# Patient Record
Sex: FEMALE | Race: WHITE | NOT HISPANIC OR LATINO | Employment: FULL TIME | URBAN - METROPOLITAN AREA
[De-identification: names, ages, dates, MRNs, and addresses within clinical notes are randomized per-mention and may not be internally consistent; named-entity substitution may affect disease eponyms.]

---

## 2019-12-16 ENCOUNTER — OFFICE VISIT CONVERTED (OUTPATIENT)
Dept: FAMILY MEDICINE CLINIC | Facility: CLINIC | Age: 39
End: 2019-12-16
Attending: NURSE PRACTITIONER

## 2019-12-16 ENCOUNTER — CONVERSION ENCOUNTER (OUTPATIENT)
Dept: FAMILY MEDICINE CLINIC | Facility: CLINIC | Age: 39
End: 2019-12-16

## 2020-09-30 ENCOUNTER — HOSPITAL ENCOUNTER (OUTPATIENT)
Dept: FAMILY MEDICINE CLINIC | Facility: CLINIC | Age: 40
Discharge: HOME | End: 2020-09-30
Attending: NURSE PRACTITIONER

## 2020-09-30 ENCOUNTER — OFFICE VISIT CONVERTED (OUTPATIENT)
Dept: FAMILY MEDICINE CLINIC | Facility: CLINIC | Age: 40
End: 2020-09-30
Attending: NURSE PRACTITIONER

## 2020-10-02 ENCOUNTER — HOSPITAL ENCOUNTER (OUTPATIENT)
Dept: LAB | Facility: HOSPITAL | Age: 40
Discharge: HOME | End: 2020-10-02
Attending: NURSE PRACTITIONER

## 2020-10-02 LAB
ALBUMIN SERPL-MCNC: 4.1 G/DL (ref 3.5–5)
ALBUMIN/GLOB SERPL: 1.5 {RATIO} (ref 1.4–2.6)
ALP SERPL-CCNC: 57 U/L (ref 42–98)
ALT SERPL-CCNC: 10 U/L (ref 10–40)
ANION GAP SERPL CALC-SCNC: 14 MMOL/L (ref 8–19)
AST SERPL-CCNC: 14 U/L (ref 15–50)
BASOPHILS # BLD AUTO: 0.04 10*3/UL (ref 0–0.2)
BASOPHILS NFR BLD AUTO: 0.6 % (ref 0–3)
BILIRUB SERPL-MCNC: 0.44 MG/DL (ref 0.2–1.3)
BUN SERPL-MCNC: 15 MG/DL (ref 5–25)
BUN/CREAT SERPL: 17 {RATIO} (ref 6–20)
CALCIUM SERPL-MCNC: 9 MG/DL (ref 8.7–10.4)
CHLORIDE SERPL-SCNC: 102 MMOL/L (ref 99–111)
CHOLEST SERPL-MCNC: 153 MG/DL (ref 107–200)
CHOLEST/HDLC SERPL: 3.4 {RATIO} (ref 3–6)
CONV ABS IMM GRAN: 0.04 10*3/UL (ref 0–0.2)
CONV CO2: 24 MMOL/L (ref 22–32)
CONV IMMATURE GRAN: 0.6 % (ref 0–1.8)
CONV TOTAL PROTEIN: 6.8 G/DL (ref 6.3–8.2)
CREAT UR-MCNC: 0.89 MG/DL (ref 0.5–0.9)
DEPRECATED RDW RBC AUTO: 42.3 FL (ref 36.4–46.3)
EOSINOPHIL # BLD AUTO: 0.14 10*3/UL (ref 0–0.7)
EOSINOPHIL # BLD AUTO: 2.1 % (ref 0–7)
ERYTHROCYTE [DISTWIDTH] IN BLOOD BY AUTOMATED COUNT: 12.7 % (ref 11.7–14.4)
GFR SERPLBLD BASED ON 1.73 SQ M-ARVRAT: >60 ML/MIN/{1.73_M2}
GLOBULIN UR ELPH-MCNC: 2.7 G/DL (ref 2–3.5)
GLUCOSE SERPL-MCNC: 87 MG/DL (ref 65–99)
HCT VFR BLD AUTO: 40 % (ref 37–47)
HDLC SERPL-MCNC: 45 MG/DL (ref 40–60)
HGB BLD-MCNC: 13.2 G/DL (ref 12–16)
LDLC SERPL CALC-MCNC: 96 MG/DL (ref 70–100)
LYMPHOCYTES # BLD AUTO: 2.28 10*3/UL (ref 1–5)
LYMPHOCYTES NFR BLD AUTO: 33.6 % (ref 20–45)
MCH RBC QN AUTO: 29.9 PG (ref 27–31)
MCHC RBC AUTO-ENTMCNC: 33 G/DL (ref 33–37)
MCV RBC AUTO: 90.7 FL (ref 81–99)
MONOCYTES # BLD AUTO: 0.55 10*3/UL (ref 0.2–1.2)
MONOCYTES NFR BLD AUTO: 8.1 % (ref 3–10)
NEUTROPHILS # BLD AUTO: 3.74 10*3/UL (ref 2–8)
NEUTROPHILS NFR BLD AUTO: 55 % (ref 30–85)
NRBC CBCN: 0 % (ref 0–0.7)
OSMOLALITY SERPL CALC.SUM OF ELEC: 282 MOSM/KG (ref 273–304)
PLATELET # BLD AUTO: 244 10*3/UL (ref 130–400)
PMV BLD AUTO: 9.8 FL (ref 9.4–12.3)
POTASSIUM SERPL-SCNC: 3.8 MMOL/L (ref 3.5–5.3)
RBC # BLD AUTO: 4.41 10*6/UL (ref 4.2–5.4)
SODIUM SERPL-SCNC: 136 MMOL/L (ref 135–147)
T4 FREE SERPL-MCNC: 1.2 NG/DL (ref 0.9–1.8)
TRIGL SERPL-MCNC: 60 MG/DL (ref 40–150)
TSH SERPL-ACNC: 1.48 M[IU]/L (ref 0.27–4.2)
VLDLC SERPL-MCNC: 12 MG/DL (ref 5–37)
WBC # BLD AUTO: 6.79 10*3/UL (ref 4.8–10.8)

## 2020-10-03 LAB
AMPICILLIN SUSC ISLT: 8
AMPICILLIN+SULBAC SUSC ISLT: 4
BACTERIA UR CULT: ABNORMAL
CEFAZOLIN SUSC ISLT: <=4
CEFEPIME SUSC ISLT: <=0.12
CEFTAZIDIME SUSC ISLT: <=1
CEFTRIAXONE SUSC ISLT: <=0.25
CIPROFLOXACIN SUSC ISLT: >=4
ERTAPENEM SUSC ISLT: <=0.12
GENTAMICIN SUSC ISLT: <=1
LEVOFLOXACIN SUSC ISLT: >=8
NITROFURANTOIN SUSC ISLT: <=16
PIP+TAZO SUSC ISLT: <=4
TMP SMX SUSC ISLT: <=20
TOBRAMYCIN SUSC ISLT: <=1

## 2021-03-24 ENCOUNTER — OFFICE VISIT CONVERTED (OUTPATIENT)
Dept: FAMILY MEDICINE CLINIC | Facility: CLINIC | Age: 41
End: 2021-03-24
Attending: NURSE PRACTITIONER

## 2021-03-24 ENCOUNTER — HOSPITAL ENCOUNTER (OUTPATIENT)
Dept: FAMILY MEDICINE CLINIC | Facility: CLINIC | Age: 41
Discharge: HOME | End: 2021-03-24
Attending: NURSE PRACTITIONER

## 2021-03-24 LAB
BILIRUB UR QL STRIP: NEGATIVE
COLOR UR: YELLOW
CONV CLARITY OF URINE: NORMAL
CONV PROTEIN IN URINE BY AUTOMATED TEST STRIP: NEGATIVE
CONV UROBILINOGEN IN URINE BY AUTOMATED TEST STRIP: NORMAL
GLUCOSE UR QL: NEGATIVE
HGB UR QL STRIP: NORMAL
KETONES UR QL STRIP: NEGATIVE
LEUKOCYTE ESTERASE UR QL STRIP: NEGATIVE
NITRITE UR QL STRIP: POSITIVE
PH UR STRIP.AUTO: 6.5 [PH]
SP GR UR: 1.01

## 2021-03-26 LAB
AMPICILLIN SUSC ISLT: 16
AMPICILLIN+SULBAC SUSC ISLT: 4
BACTERIA UR CULT: ABNORMAL
CEFAZOLIN SUSC ISLT: <=4
CEFTAZIDIME SUSC ISLT: <=1
CEFTRIAXONE SUSC ISLT: <=0.25
CIPROFLOXACIN SUSC ISLT: >=4
ERTAPENEM SUSC ISLT: <=0.12
GENTAMICIN SUSC ISLT: <=1
LEVOFLOXACIN SUSC ISLT: >=8
NITROFURANTOIN SUSC ISLT: <=16
TMP SMX SUSC ISLT: <=20
TOBRAMYCIN SUSC ISLT: <=1

## 2021-03-29 LAB
CONV LAST MENSTURAL PERIOD: NORMAL
SPECIMEN SOURCE: NORMAL
SPECIMEN SOURCE: NORMAL
THIN PREP CVX: NORMAL

## 2021-05-13 NOTE — PROGRESS NOTES
Progress Note      Patient Name: Kathy Spicer   Patient ID: [id number]   Sex: Female   YOB: 1980    Primary Care Provider: Jeny MAZARIEGOS   Referring Provider: Jeny MAZARIEGOS    Visit Date: 2020    Provider: DELILAH Funk   Location: [facility] [place]   Location Address: [address] [place] [address], [address]  [place]  [id number]   Location Phone: [phone]          Chief Complaint  · possible UTI      History Of Present Illness  Kathy Spicer is a 40 year old /White female who presents for evaluation and treatment of:      Pt is here for possible UTI. Pt states s/s started approx. one mo ago. She states she took OTC medications and s/s cleared up. 2 wks ago, pt states s/s returned, pressure, painful urination, malodorous urine. Pt took OTC medications again. Pt wants to make sure nothing is still present. She reports dysuria has improved but has pressure to urinate at times.    Pt quit taking Lexapro after one mo. She states she did not notice a difference.  She is not interested in trying anything else.    Pt is due labs.     Pt gets her PAP[facility] at [facility] in [place].       Past Medical History  Disease Name Date Onset Notes   *No Pertinent Past Medical History --  --          Past Surgical History  Procedure Name Date Notes   Laproscopy  Endometriosis         Allergy List  Allergen Name Date Reaction Notes   amoxicillin --  hives --    PENICILLINS --  --  --        Allergies Reconciled  Family Medical History  Disease Name Relative/Age Notes   Stroke Father/  Grandmother (paternal)/   --    Lymphoma, Malignant Father/   --    Prostate cancer Father/   --    Diabetes Grandmother (maternal)/  Mother/   --          Reproductive History  Menstrual   Age Menarche: 14   Pregnancy Summary   Total Pregnancies: 2 Full Term: 1 Premature: 0   Ab Induced: 0 Ab Spontaneous: 0 Ectopics: 0   Multiples: 0 Livin         Social  "History  Finding Status Start/Stop Quantity Notes   Alcohol Never --/-- --  --     --  --/-- --  --    Minimal Amount of Exercise (Once weekly or less) --  --/-- --  --    No known infection risk --  --/-- --  --    Tobacco Never --/-- --  --          Immunizations  NameDate Admin Mfg Trade Name Lot Number Route Inj VIS Given VIS Publication   [id number]/01/2019 NE Not Entered  NE NE     Comments: Pt reports receiving flu vaccine at [facility] 12/19         Review of Systems  · Constitutional  o Denies  o : fever, fatigue, weight loss, weight gain  · Cardiovascular  o Denies  o : lower extremity edema, claudication, chest pressure, palpitations  · Respiratory  o Denies  o : shortness of breath, wheezing, cough, hemoptysis, dyspnea on exertion  · Gastrointestinal  o Denies  o : nausea, vomiting, diarrhea, constipation, abdominal pain  · Genitourinary  o Admits  o : urgency, dysuria      Vitals  Date Time BP Position Site L\R Cuff Size HR RR TEMP (F) WT  HT  BMI kg/m2 BSA m2 O2 Sat FR L/min FiO2 HC       06/12/2015 10:18 /79 Sitting    74 - R  97.5 149lbs 0oz 5'  5\" 24.79 1.76       12/16/2019 03:23 /83 Sitting    84 - R   162lbs 2oz 5'  5\" 26.98 1.84 99 %      09/30/2020 08:52 /71 Sitting    73 - R   161lbs 2oz 5'  5\" 26.81 1.83 100 %  21%          Physical Examination  · Constitutional  o Appearance  o : well-nourished, well developed, alert, in no acute distress  · Ears, Nose, Mouth and Throat  o Ears  o :   § External Ears  § : appearance within normal limits, no lesions present  § Otoscopic Examination  § : tympanic membrane appearance within normal limits bilaterally without perforations, mobility normal  o Nose  o :   § External Nose  § : normal stucture noted.  § Intranasal Exam  § : no swelling, reddness, turbs normal yariel.  o Oral Cavity  o :   § Oral Mucosa  § : oral mucosa normal without pallor or cyanosis  § Lips  § : lip appearance normal  § Teeth  § : normal dentition for " age  § Gums  § : gums pink, non-swollen, no bleeding present  § Tongue  § : tongue appearance normal  § Palate  § : hard palate normal, soft palate appearance normal  o Throat  o :   § Oropharynx  § : no inflammation or lesions present, tonsils within normal limits  · Respiratory  o Respiratory Effort  o : breathing unlabored  o Auscultation of Lungs  o : normal breath sounds throughout  · Cardiovascular  o Heart  o :   § Auscultation of Heart  § : regular rate and rhythm, no murmurs, gallops or rubs  § Palpation of Heart  § : normal apical impulse, no cardiac thrill present  o Peripheral Vascular System  o :   § Pedal Pulses  § : pulses 2 bilaterally  § Extremities  § : no cyanosis, clubbing or edema; less than 2 second refill noted  · Gastrointestinal  o Abdominal Examination  o : abdomen nontender to palpation, tone normal without rigidity or guarding, no masses present, abdominal contour scaphoid  o Liver and spleen  o : no hepatomegaly present, liver nontender to palpation, spleen not palpable  · Genitourinary  o FEMALE  EXAM:  o :   §  and rectal exam deferred  § : negative CVA tenderness  · Skin and Subcutaneous Tissue  o General Inspection  o : no rashes or lesions present, no areas of discoloration  · Neurologic  o Mental Status Examination  o :   § Orientation  § : grossly oriented to person, place and time  o Cranial Nerves  o : cranial nerves intact and symmetric throughout  · Psychiatric  o Mood and Affect  o : mood normal, affect appropriate, denies any SI/HI          Results  · In-Office Procedures  o Lab procedure  § IOP - Urinalysis without Microscopy (Clinitek) [facility] (50706)   § Color Ur: Lt. Yellow   § Clarity Ur: Clear   § Glucose Ur Ql Strip: Negative   § Bilirub Ur Ql Strip: Negative   § Ketones Ur Ql Strip: Negative   § Sp Gr Ur Qn: 1.025   § Hgb Ur Ql Strip: Trace-Intact   § pH Ur-LsCnc: 6.0   § Prot Ur Ql Strip: Negative   § Urobilinogen Ur Strip-mCnc: 0.2 E.U./dL   § Nitrite Ur Ql Strip:  Negative   § WBC Est Ur Ql Strip: Negative       Assessment  · Fatigue     780.79/R53.83  · Screening for lipid disorders     V77.91/Z13.220  · Malodorous urine     791.9/R82.90  · Painful urination     788.1/R30.9  · Dysuria     788.1/R30.0  · Screening for thyroid disorder     V77.0/Z13.29      Plan  · Orders  o CBC with Auto Diff [facility] (12732) - 780.79/R53.83 - 09/30/2020  o CMP [facility] (37318) - 780.79/R53.83 - 09/30/2020  o Thyroid Profile (96677, 46606, THYII) - 780.79/R53.83 - 09/30/2020  o Lipid Panel [facility] (04921) - V77.91/Z13.220 - 09/30/2020  o Urine culture (11762, 37326) - 791.9/R82.90, 788.1/R30.9, 788.1/R30.0 - 09/30/2020  o ACO-39: Current medications updated and reviewed (, 1159F) - - 09/30/2020  o OB/GYN CONSULTATION (OBGYN) - - 09/30/2020  · Medications  o Medications have been Reconciled  o Transition of Care or Provider Policy  · Instructions  o Patient was educated/instructed on their diagnosis, treatment and medications prior to discharge from the clinic today.  o Patient instructed to seek medical attention urgently for new or worsening symptoms.  o Call the office with any concerns or questions.  · Disposition  o Call or Return if symptoms worsen or persist.            Electronically Signed by: DELILAH Funk -Author on September 30, 2020 09:11:03 AM

## 2021-05-14 VITALS
BODY MASS INDEX: 27.05 KG/M2 | HEIGHT: 65 IN | HEART RATE: 82 BPM | WEIGHT: 162.37 LBS | OXYGEN SATURATION: 100 % | SYSTOLIC BLOOD PRESSURE: 126 MMHG | DIASTOLIC BLOOD PRESSURE: 84 MMHG

## 2021-05-14 VITALS
HEART RATE: 73 BPM | WEIGHT: 161.12 LBS | SYSTOLIC BLOOD PRESSURE: 109 MMHG | OXYGEN SATURATION: 100 % | HEIGHT: 65 IN | BODY MASS INDEX: 26.84 KG/M2 | DIASTOLIC BLOOD PRESSURE: 71 MMHG

## 2021-05-14 NOTE — PROGRESS NOTES
Progress Note      Patient Name: Kathy Spicer   Patient ID: [id number]   Sex: Female   YOB: 1980    Primary Care Provider: Jeny MAZARIEGOS   Referring Provider: Jeny MAZARIEGOS    Visit Date: 2021    Provider: DELILAH Funk   Location: [facility] [place]   Location Address: [address] [place] [address], [address]  [place], [place]  [id number]   Location Phone: [phone]          Chief Complaint  · Annual Exam  · PAP exam  · (Health Maintainence Information Reviewed Under Results)      History Of Present Illness  Kathy Spicer is a 40 year old /White female who presents for evaluation and treatment of:   Last PAP Smear: 2020.   No current complaints.      Pt is here for her pap smear, she has noticed that she has a strong foul odor with her urine, no pain with urination.     LMP: 3 weeks ago  flu: declined  labs:2020       Past Medical History  Disease Name Date Onset Notes   *No Pertinent Past Medical History --  --          Past Surgical History  Procedure Name Date Notes   Laproscopy  Endometriosis         Allergy List  Allergen Name Date Reaction Notes   amoxicillin --  hives --    PENICILLINS --  --  --          Family Medical History  Disease Name Relative/Age Notes   Stroke Father/  Grandmother (paternal)/   --    Lymphoma, Malignant Father/   --    Prostate cancer Father/   --    Diabetes Grandmother (maternal)/  Mother/   --          Reproductive History  Menstrual   Age Menarche: 14   Pregnancy Summary   Total Pregnancies: 2 Full Term: 1 Premature: 0   Ab Induced: 0 Ab Spontaneous: 0 Ectopics: 0   Multiples: 0 Livin         Social History  Finding Status Start/Stop Quantity Notes   Alcohol Never --/-- --  --     --  --/-- --  --    Minimal Amount of Exercise (Once weekly or less) --  --/-- --  --    No known infection risk --  --/-- --  --    Tobacco Never --/-- --  --          Immunizations  NameDate Admin Mfg Trade  "Name Lot Number Route Inj VIS Given VIS Publication   [id number]/01/2019 NE Not Entered  NE NE     Comments: Pt reports receiving flu vaccine at [facility] 12/19         Review of Systems  · Constitutional  o Denies  o : fever, fatigue, weight loss, weight gain  · Cardiovascular  o Denies  o : lower extremity edema, claudication, chest pressure, palpitations  · Respiratory  o Denies  o : shortness of breath, wheezing, cough, hemoptysis, dyspnea on exertion  · Gastrointestinal  o Denies  o : nausea, vomiting, diarrhea, constipation, abdominal pain  · Genitourinary  o Denies  o : urgency, frequency, dysuria, nocturia, hematuria      Vitals  Date Time BP Position Site L\R Cuff Size HR RR TEMP (F) WT  HT  BMI kg/m2 BSA m2 O2 Sat FR L/min FiO2 HC       06/12/2015 10:18 /79 Sitting    74 - R  97.5 149lbs 0oz 5'  5\" 24.79 1.76       12/16/2019 03:23 /83 Sitting    84 - R   162lbs 2oz 5'  5\" 26.98 1.84 99 %      09/30/2020 08:52 /71 Sitting    73 - R   161lbs 2oz 5'  5\" 26.81 1.83 100 %  21%    03/24/2021 01:41 /84 Sitting    82 - R   162lbs 6oz 5'  5\" 27.02 1.84 100 %            Physical Examination  · Constitutional  o Appearance  o : well-nourished, in no acute distress  · Ears, Nose, Mouth and Throat  o Ears  o :   § External Ears  § : appearance within normal limits, no lesions present  § Otoscopic Examination  § : tympanic membrane appearance within normal limits bilaterally without perforations, mobility normal  o Nose  o :   § External Nose  § : normal stucture noted.  § Intranasal Exam  § : no swelling, reddness, turbs normal yariel.  o Oral Cavity  o :   § Oral Mucosa  § : oral mucosa normal without pallor or cyanosis  § Lips  § : lip appearance normal  § Teeth  § : normal dentition for age  § Gums  § : gums pink, non-swollen, no bleeding present  § Tongue  § : tongue appearance normal  § Palate  § : hard palate normal, soft palate appearance normal  o Throat  o :   § Oropharynx  § : no " inflammation or lesions present, tonsils within normal limits  · Respiratory  o Respiratory Effort  o : breathing unlabored  o Inspection of Chest  o : normal appearance  o Auscultation of Lungs  o : normal breath sounds throughout  · Cardiovascular  o Heart  o :   § Auscultation of Heart  § : regular rate and rhythm, no murmurs, gallops or rubs  § Palpation of Heart  § : normal apical impulse, no cardiac thrill present  o Peripheral Vascular System  o :   § Extremities  § : no cyanosis, clubbing or edema; less than 2 second refill noted  · Breasts  o Inspection of Breasts  o : breasts symmetrical, no skin changes, no deformities present, no discharge present  o Palpation of Breasts, Axillae  o : no masses present on palpation, no breast tenderness  · Gastrointestinal  o Abdominal Examination  o : abdomen nontender to palpation, tone normal without rigidity or guarding, no masses present, normal bowel sounds  o Liver and spleen  o : no hepatomegaly present, liver nontender to palpation, spleen not palpable  · Genitourinary  o External Genitalia  o : no inflammation, no lesions present  o Vagina  o : normal vaginal vault, no discharge present, no inflammatory lesions present, no masses present  o Bladder  o : nontender to palpation  o Cervix  o : appearance healthy, no lesions present, nontender to palpation, no discharges, no bleeding present, normal midline position  o Uterus  o : nontender to palpation, no masses present, position midline/midplane  o Adnexa  o : no tenderness or masses present on bimanual examination  o Anus  o : no inflammation or lesions present  o Perineum  o : perineum within normal limits  · Lymphatic  o Neck  o : no lymphadenopathy present  o Axilla  o : no lymphadenopathy present  o Groin  o : no lymphadenopathy present  · Skin and Subcutaneous Tissue  o General Inspection  o : no rashes or lesions present, no areas of discoloration  · Neurologic  o Mental Status Examination  o :    § Orientation  § : grossly oriented to person, place and time  o Cranial Nerves  o : cranial nerves intact and symmetric throughout  o Gait and Station  o : normal gait, able to stand without difficulty  · Psychiatric  o Judgement and Insight  o : judgment and insight intact  o Mood and Affect  o : mood normal, affect appropriate          Results  · In-Office Procedures  o Lab procedure  § IOP - Urinalysis without Microscopy (Clinitek) [facility] (13864)   § Color Ur: Yellow   § Clarity Ur: Cloudy   § Glucose Ur Ql Strip: Negative   § Bilirub Ur Ql Strip: Negative   § Ketones Ur Ql Strip: Negative   § Sp Gr Ur Qn: 1.015   § Hgb Ur Ql Strip: Trace-Intact   § pH Ur-LsCnc: 6.5   § Prot Ur Ql Strip: Negative   § Urobilinogen Ur Strip-mCnc: 0.2 E.U./dL   § Nitrite Ur Ql Strip: Positive   § WBC Est Ur Ql Strip: Negative       Assessment  · Screening for depression     V79.0/Z13.89  · Routine gynecological examination     V72.31/Z01.419  · Pap smear, as part of routine gynecological examination     V76.2/Z01.419  · Screening Mammogram     V76.10/Z12.39  · Abnormal urinalysis     791.9/R82.90  · Urine malodor     791.9/R82.90    Problems Reconciled  Plan  · Orders  o ACO-18: Negative screen for clinical depression using a standardized tool () - V79.0/Z13.89 - 03/24/2021  o Pap smear (60544) - V76.2/Z01.419 - 03/24/2021  o Screening Mammogram 3D Bilateral (08537, 75799, ) - V76.10/Z12.39 - 09/02/2021   THALIA 9/2/21 at 11am  o Urine culture (03891, 87286) - 791.9/R82.90 - 03/24/2021  o ACO-14: Influenza immunization was not administered for reasons documented [facility] () - - 03/24/2021  o ACO-39: Current medications updated and reviewed (, 1159B) - - 03/24/2021  · Medications  o Medications have been Reconciled  o Transition of Care or Provider Policy  · Instructions  o Depression Screen completed and scanned into the EMR under the designated folder within the patient's documents.  o Today's PHQ-9 result is 2  o The  provider screening met the required time of 15 minutes.  o **Pap Test/Liquid Based:   o Thin Prep  o Source:  o Vaginal  o ********  o **Perform Reflex Human Papilloma Virus (HPV) High Risk on this Pap (If atypical squamous cells of the undetermined signifigcance (ASCUS)/Atypical Glandular Cells of undetermined significance (AGCUS): Low Grade Squamous Intraepitheal lesion (LGSIL): **  o Yes, if abnormal  o ********  o Medicare:  o No  o **Is this an annual PAP:  o Yes  o Last Menstrual Period (First Day of): three weeks ago  o Patient was educated/instructed on their diagnosis, treatment and medications prior to discharge from the clinic today.  o Flu vaccine declined.  o Counseled on monthly breast self exams.   o Counseled on diet and exercise.   o Counseled on weight-bearing exercise.  o Used cytobrush to obtain Pap smear specimen. Sent to pathology for testing and review.            Electronically Signed by: DELILAH Funk -Author on March 24, 2021 02:11:51 PM

## 2021-05-15 VITALS
DIASTOLIC BLOOD PRESSURE: 83 MMHG | HEART RATE: 84 BPM | SYSTOLIC BLOOD PRESSURE: 130 MMHG | WEIGHT: 162.12 LBS | HEIGHT: 65 IN | OXYGEN SATURATION: 99 % | BODY MASS INDEX: 27.01 KG/M2

## 2021-05-22 ENCOUNTER — TRANSCRIBE ORDERS (OUTPATIENT)
Dept: ADMINISTRATIVE | Facility: HOSPITAL | Age: 41
End: 2021-05-22

## 2021-05-22 DIAGNOSIS — Z12.31 VISIT FOR SCREENING MAMMOGRAM: Primary | ICD-10-CM

## 2021-09-02 ENCOUNTER — APPOINTMENT (OUTPATIENT)
Dept: MAMMOGRAPHY | Facility: HOSPITAL | Age: 41
End: 2021-09-02

## 2021-09-07 ENCOUNTER — OFFICE VISIT (OUTPATIENT)
Dept: FAMILY MEDICINE CLINIC | Facility: CLINIC | Age: 41
End: 2021-09-07

## 2021-09-07 VITALS
DIASTOLIC BLOOD PRESSURE: 58 MMHG | TEMPERATURE: 97.6 F | WEIGHT: 158 LBS | OXYGEN SATURATION: 99 % | HEART RATE: 66 BPM | HEIGHT: 66 IN | SYSTOLIC BLOOD PRESSURE: 96 MMHG | BODY MASS INDEX: 25.39 KG/M2

## 2021-09-07 DIAGNOSIS — L30.9 DERMATITIS: Primary | ICD-10-CM

## 2021-09-07 DIAGNOSIS — U07.1 COVID-19: ICD-10-CM

## 2021-09-07 PROCEDURE — 99213 OFFICE O/P EST LOW 20 MIN: CPT | Performed by: NURSE PRACTITIONER

## 2021-09-07 RX ORDER — METHYLPREDNISOLONE SODIUM SUCCINATE 40 MG/ML
60 INJECTION, POWDER, LYOPHILIZED, FOR SOLUTION INTRAMUSCULAR; INTRAVENOUS ONCE
Status: DISCONTINUED | OUTPATIENT
Start: 2021-09-07 | End: 2022-05-26

## 2021-09-07 NOTE — PROGRESS NOTES
"Chief Complaint  Rash (all over body started Friday 9/3/2021) and Insomnia (Post-COVID-19)    Subjective          Kathy Mccain presents to [facility]  Pt states that she has had this rash since Friday or Saturday has been really bad with itching all over her body that she used her  Prednisone to help with the itching and has helped. States she has been taking Benadryl q hx.  States she has not ate anything new or been exposed to new products or been outdoors. States she did just get over COVID and that is the only thing new. She was diagnosed on 8/26/21 and the rash started about a week later. Admits the rash is itchy and it has been on her arms, leg, groin, abdomen, souls of feet and hands. Her eyelids were swollen on Sunday she applied cool compresses and it resolved.     Pt states that since having COVID she has been having insomnia.    Rash    Insomnia  Associated symptoms include a rash.       She  has no past medical history on file.     Objective   Vital Signs:   BP 96/58 (BP Location: Right arm, Patient Position: Sitting, Cuff Size: Adult)   Pulse 66   Temp 97.6 °F (36.4 °C)   Ht 167.6 cm (66\")   Wt 71.7 kg (158 lb)   SpO2 99%   BMI 25.50 kg/m²     Physical Exam  Constitutional:       Appearance: Normal appearance.   Cardiovascular:      Rate and Rhythm: Normal rate and regular rhythm.      Pulses: Normal pulses.      Heart sounds: Normal heart sounds.   Pulmonary:      Effort: Pulmonary effort is normal.      Breath sounds: Normal breath sounds.   Neurological:      Mental Status: She is alert.        Result Review :              Current Outpatient Medications:   •  triamcinolone (KENALOG) 0.1 % ointment, Apply  topically to the appropriate area as directed 2 (Two) Times a Day., Disp: 80 g, Rfl: 0    Current Facility-Administered Medications:   •  methylPREDNISolone sodium succinate (SOLU-Medrol) injection 60 mg, 60 mg, Intravenous, Once, Vicky Grover " DELILAH Durham   Assessment and Plan    Diagnoses and all orders for this visit:    1. Dermatitis (Primary)  Comments:  rash that waxes and wanes on extremities and trunk-kenalog 60mg IM administered in the office today. Instructed to do zyrtec am and benadryl q hs and pepcid prn  Orders:  -     triamcinolone (KENALOG) 0.1 % ointment; Apply  topically to the appropriate area as directed 2 (Two) Times a Day.  Dispense: 80 g; Refill: 0  -     methylPREDNISolone sodium succinate (SOLU-Medrol) injection 60 mg    2. COVID-19  -     triamcinolone (KENALOG) 0.1 % ointment; Apply  topically to the appropriate area as directed 2 (Two) Times a Day.  Dispense: 80 g; Refill: 0  -     methylPREDNISolone sodium succinate (SOLU-Medrol) injection 60 mg        Follow Up   No follow-ups on file.  Patient was given instructions and counseling regarding her condition or for health maintenance advice. Please see specific information pulled into the AVS if appropriate.     Kathy Mccain  reports that she has never smoked. She has never used smokeless tobacco..              DELILAH Espinal

## 2021-11-04 ENCOUNTER — OFFICE VISIT (OUTPATIENT)
Dept: FAMILY MEDICINE CLINIC | Facility: CLINIC | Age: 41
End: 2021-11-04

## 2021-11-04 VITALS
WEIGHT: 156 LBS | BODY MASS INDEX: 25.07 KG/M2 | DIASTOLIC BLOOD PRESSURE: 69 MMHG | HEART RATE: 72 BPM | SYSTOLIC BLOOD PRESSURE: 116 MMHG | OXYGEN SATURATION: 99 % | HEIGHT: 66 IN

## 2021-11-04 DIAGNOSIS — N92.6 IRREGULAR MENSTRUATION: Primary | ICD-10-CM

## 2021-11-04 PROCEDURE — 99213 OFFICE O/P EST LOW 20 MIN: CPT | Performed by: NURSE PRACTITIONER

## 2021-11-04 NOTE — PROGRESS NOTES
"Chief Complaint  Menstrual Problem    Subjective            Kathy Mccain presents to [facility]  Pt has c/o menstrual bleeding for 2 wks. Pt states this started on 10/16/21 and didn't quit till 11/2/21. This was like having 2 periods back to back. Nothing abnormal such as clotting or heavy.     Pt states here menstrual cycles are every 4 wks. Described as heavy and clotting like.     Pt used to see EPW for GYN, requests to see Hemalatha Prado.  She is concerned about uterine fibroids.    Had a normal pap this year with our office.        PMH  No past medical history on file.    ALLERGY  Allergies   Allergen Reactions   • Amoxicillin Hives        SURGICALHX  No past surgical history on file.     SOCX  Social History     Tobacco Use   • Smoking status: Never Smoker   • Smokeless tobacco: Never Used   Substance Use Topics   • Alcohol use: Never       FAMHX  No family history on file.     MEDSIGONLY  Current Outpatient Medications on File Prior to Visit   Medication Sig   • [DISCONTINUED] triamcinolone (KENALOG) 0.1 % ointment Apply  topically to the appropriate area as directed 2 (Two) Times a Day.     Current Facility-Administered Medications on File Prior to Visit   Medication   • methylPREDNISolone sodium succinate (SOLU-Medrol) injection 60 mg       Health Maintenance Due   Topic Date Due   • ANNUAL PHYSICAL  Never done   • HEPATITIS C SCREENING  Never done       Objective     /69   Pulse 72   Ht 167.6 cm (66\")   Wt 70.8 kg (156 lb)   SpO2 99%   BMI 25.18 kg/m²       Physical Exam  Constitutional:       General: She is not in acute distress.     Appearance: Normal appearance. She is not ill-appearing.   HENT:      Head: Normocephalic and atraumatic.      Mouth/Throat:      Pharynx: No oropharyngeal exudate or posterior oropharyngeal erythema.   Cardiovascular:      Rate and Rhythm: Normal rate and regular rhythm.      Heart sounds: Normal heart sounds. No murmur " heard.      Pulmonary:      Effort: Pulmonary effort is normal. No respiratory distress.      Breath sounds: Normal breath sounds.   Chest:      Chest wall: No tenderness.   Abdominal:      General: There is no distension.      Palpations: There is no mass.      Tenderness: There is no abdominal tenderness. There is no guarding.   Genitourinary:     Comments: deferred  Musculoskeletal:         General: No swelling or tenderness. Normal range of motion.      Cervical back: Normal range of motion and neck supple.   Skin:     General: Skin is warm and dry.      Findings: No rash.   Neurological:      General: No focal deficit present.      Mental Status: She is alert and oriented to person, place, and time. Mental status is at baseline.      Gait: Gait normal.   Psychiatric:         Mood and Affect: Mood normal.         Behavior: Behavior normal.         Thought Content: Thought content normal.         Judgment: Judgment normal.           Result Review :                           Assessment and Plan        Diagnoses and all orders for this visit:    1. Irregular menstruation (Primary)  -     Ambulatory Referral to Obstetrics / Gynecology              Follow Up     Return if symptoms worsen or fail to improve.    Patient was given instructions and counseling regarding her condition or for health maintenance advice. Please see specific information pulled into the AVS if appropriate.     Kathy Mccain  reports that she has never smoked. She has never used smokeless tobacco..

## 2021-12-14 ENCOUNTER — OFFICE VISIT (OUTPATIENT)
Dept: OBSTETRICS AND GYNECOLOGY | Facility: CLINIC | Age: 41
End: 2021-12-14

## 2021-12-14 VITALS
HEART RATE: 73 BPM | WEIGHT: 156 LBS | HEIGHT: 65 IN | DIASTOLIC BLOOD PRESSURE: 87 MMHG | BODY MASS INDEX: 25.99 KG/M2 | SYSTOLIC BLOOD PRESSURE: 132 MMHG

## 2021-12-14 DIAGNOSIS — N80.9 ENDOMETRIOSIS: ICD-10-CM

## 2021-12-14 DIAGNOSIS — N93.9 ABNORMAL UTERINE BLEEDING (AUB): Primary | ICD-10-CM

## 2021-12-14 DIAGNOSIS — E01.0 THYROMEGALY: ICD-10-CM

## 2021-12-14 LAB
ESTRADIOL SERPL HS-MCNC: 608 PG/ML
FSH SERPL-ACNC: 14.5 MIU/ML
PROLACTIN SERPL-MCNC: 22.1 NG/ML (ref 4.79–23.3)

## 2021-12-14 PROCEDURE — 99204 OFFICE O/P NEW MOD 45 MIN: CPT | Performed by: OBSTETRICS & GYNECOLOGY

## 2021-12-14 PROCEDURE — 82670 ASSAY OF TOTAL ESTRADIOL: CPT | Performed by: OBSTETRICS & GYNECOLOGY

## 2021-12-14 PROCEDURE — 84146 ASSAY OF PROLACTIN: CPT | Performed by: OBSTETRICS & GYNECOLOGY

## 2021-12-14 PROCEDURE — 83001 ASSAY OF GONADOTROPIN (FSH): CPT | Performed by: OBSTETRICS & GYNECOLOGY

## 2021-12-14 NOTE — PROGRESS NOTES
"GYN new patient    CC: aub    Tobacco/Nicotine use:  No    HPI:   41 y.o. Contraception or HRT: None    Has a known h/o endometriosis diagnosed on laparoscopy .  She has been asymptomatic since that time until the last year.  Menses are q28-29 days.  In October she had 2 cycles back to back.  States cycles are very heavy.  Lasting 5-6 days. She will pass large clots and then have gushing for the next 48 hours.  Has to go to restroom frequently when menses are heavy.  Can feel the clots coming and then goes to the restroom.  States menses are not painful.  States they have never been heavy.  Was diagnosed with endometriosis during work up for infertility.  Recently within last 2 months has had some dyspareunia- can be positional.  Ref by her PCP      History: PMHx, Meds, Allergies, PSHx, Social Hx, and POBHx all reviewed and updated.  PCP: does manage PMHx and preventative labs    Review of Systems   Genitourinary: Positive for dyspareunia, menstrual problem and vaginal bleeding.   All other systems reviewed and are negative.       /87   Pulse 73   Ht 165.1 cm (65\")   Wt 70.8 kg (156 lb)   LMP 2021 (Exact Date)   BMI 25.96 kg/m²     Physical Exam  Vitals and nursing note reviewed. Exam conducted with a chaperone present.   Constitutional:       Appearance: Normal appearance.   Neck:      Thyroid: Thyromegaly (right sided thyromegaly) present. No thyroid mass.   Cardiovascular:      Rate and Rhythm: Normal rate and regular rhythm.      Heart sounds: Normal heart sounds.   Pulmonary:      Effort: Pulmonary effort is normal.      Breath sounds: Normal breath sounds.   Abdominal:      General: Abdomen is flat. Bowel sounds are normal.      Palpations: Abdomen is soft.   Genitourinary:     General: Normal vulva.      Exam position: Lithotomy position.      Labia:         Right: No lesion.         Left: No lesion.       Urethra: No prolapse or urethral lesion.      Vagina: Normal.      Cervix: " Normal.      Uterus: Normal. Not fixed and not tender.       Adnexa: Right adnexa normal and left adnexa normal.        Right: No mass or tenderness.          Left: No mass or tenderness.     Musculoskeletal:      Cervical back: Full passive range of motion without pain.   Neurological:      Mental Status: She is alert.         ASSESSMENT AND PLAN:  Problem Visit    Diagnoses and all orders for this visit:    1. Abnormal uterine bleeding (AUB) (Primary)  Overview:  Very heavy for the last year    Orders:  -     US Non-ob Transvaginal; Future  -     Follicle Stimulating Hormone; Future  -     Prolactin; Future  -     Estradiol; Future  -     Follicle Stimulating Hormone  -     Prolactin  -     Estradiol    2. Thyromegaly  Overview:  Right sided  Thyroid labs normal 10/2021  Check thyroid ultrasound    Orders:  -     US Thyroid; Future    3. Endometriosis  Overview:  Never required treatment after diagnosis  Could be contributing to symptoms now                  Follow Up:  Return for post ultrasound.        Hemalatha Prado MD  12/14/2021

## 2022-01-11 ENCOUNTER — HOSPITAL ENCOUNTER (OUTPATIENT)
Dept: ULTRASOUND IMAGING | Facility: HOSPITAL | Age: 42
Discharge: HOME | End: 2022-01-11

## 2022-01-11 DIAGNOSIS — N93.9 ABNORMAL UTERINE BLEEDING (AUB): ICD-10-CM

## 2022-01-11 DIAGNOSIS — E01.0 THYROMEGALY: ICD-10-CM

## 2022-01-11 PROCEDURE — 76830 TRANSVAGINAL US NON-OB: CPT

## 2022-01-11 PROCEDURE — 76536 US EXAM OF HEAD AND NECK: CPT

## 2022-01-18 ENCOUNTER — OFFICE VISIT (OUTPATIENT)
Dept: OBSTETRICS AND GYNECOLOGY | Facility: CLINIC | Age: 42
End: 2022-01-18

## 2022-01-18 VITALS
SYSTOLIC BLOOD PRESSURE: 123 MMHG | WEIGHT: 156 LBS | HEART RATE: 83 BPM | BODY MASS INDEX: 25.96 KG/M2 | DIASTOLIC BLOOD PRESSURE: 85 MMHG

## 2022-01-18 DIAGNOSIS — N80.9 ENDOMETRIOSIS: ICD-10-CM

## 2022-01-18 DIAGNOSIS — N93.9 ABNORMAL UTERINE BLEEDING (AUB): Primary | ICD-10-CM

## 2022-01-18 DIAGNOSIS — E01.0 THYROMEGALY: ICD-10-CM

## 2022-01-18 PROCEDURE — 99214 OFFICE O/P EST MOD 30 MIN: CPT | Performed by: OBSTETRICS & GYNECOLOGY

## 2022-01-18 NOTE — ASSESSMENT & PLAN NOTE
I reviewed transvaginal ultrasound and it is normal  I reviewed the patient's blood work with the patient no concerns today  We discussed expectant management versus medical management in particular with a Mirena IUD versus surgical management  The patient wants to consider the Mirena IUD and she was provided with written information

## 2022-01-18 NOTE — ASSESSMENT & PLAN NOTE
Thyroid ultrasound showed a 5 mm and 3 mm nonsuspicious appearing nodule in the upper lobe of the right side  No further intervention is indicated at this time

## 2022-01-18 NOTE — PROGRESS NOTES
GYN Visit    CC: Abnormal uterine bleeding    HPI:   41 y.o.   Pt has no complaints today.  She states her last cycle was not bad          History: PMHx, Meds, Allergies, PSHx, Social Hx, and POBHx all reviewed and updated.  Physical Exam   PHYSICAL EXAM:  /85   Pulse 83   Wt 70.8 kg (156 lb)   LMP 2021   BMI 25.96 kg/m²   General- NAD, alert and oriented, appropriate  Psych- Normal mood, good memory      ASSESSMENT AND PLAN:  Diagnoses and all orders for this visit:    1. Abnormal uterine bleeding (AUB) (Primary)  Overview:  Very heavy for the last year    Assessment & Plan:  I reviewed transvaginal ultrasound and it is normal  I reviewed the patient's blood work with the patient no concerns today  We discussed expectant management versus medical management in particular with a Mirena IUD versus surgical management  The patient wants to consider the Mirena IUD and she was provided with written information      2. Endometriosis  Overview:  Never required treatment after diagnosis  Could be contributing to symptoms now    Assessment & Plan:  Patient states she is experiencing somewhat increased dysmenorrhea than the last 10 years  She is not interested in hysterectomy at this time  Explained that given her history of endometriosis would lean more towards hysterectomy than endometrial ablation should we progress to surgical management      3. Thyromegaly  Overview:  Right sided  Thyroid labs normal 10/2021  Check thyroid ultrasound    Assessment & Plan:  Thyroid ultrasound showed a 5 mm and 3 mm nonsuspicious appearing nodule in the upper lobe of the right side  No further intervention is indicated at this time        Counseling: TRACK MENSES, RTO if <q21d, >7d long, heavy or painful.    ENDOMETRIOSIS- Dx, incidence, familial tendency, recurrence, periods/pain/dyspareunia, pregnancy, risk of other pain syndromes. Tx options wrt pt hx NLT expectant, hormonal (BC, IUD, Lupron, Orilissa, others),  outpt dx L/S, hyst +/- BSO.        [facility]:  [facility]          Hemalatha Prado MD  01/18/2022

## 2022-01-18 NOTE — ASSESSMENT & PLAN NOTE
Patient states she is experiencing somewhat increased dysmenorrhea than the last 10 years  She is not interested in hysterectomy at this time  Explained that given her history of endometriosis would lean more towards hysterectomy than endometrial ablation should we progress to surgical management

## 2022-05-09 ENCOUNTER — OFFICE VISIT (OUTPATIENT)
Dept: FAMILY MEDICINE CLINIC | Facility: CLINIC | Age: 42
End: 2022-05-09

## 2022-05-09 VITALS
WEIGHT: 157 LBS | HEIGHT: 65 IN | OXYGEN SATURATION: 99 % | DIASTOLIC BLOOD PRESSURE: 83 MMHG | BODY MASS INDEX: 26.16 KG/M2 | SYSTOLIC BLOOD PRESSURE: 126 MMHG | HEART RATE: 70 BPM

## 2022-05-09 DIAGNOSIS — R22.31 MASS OF RIGHT UPPER EXTREMITY: Primary | ICD-10-CM

## 2022-05-09 PROCEDURE — 99214 OFFICE O/P EST MOD 30 MIN: CPT | Performed by: NURSE PRACTITIONER

## 2022-05-09 NOTE — PROGRESS NOTES
"Chief Complaint  Mass right upper arm    Subjective            Kathy Mccain presents to [facility]  Pt is here due to a lump on right upper found by dermatology. Pt states there is no pain unless pressed on. Pt states it is soft to touch. Pt unsure how long it has been there.        Past Medical History:   Diagnosis Date   • Abnormal Pap smear of cervix    • Endometriosis    • Herpes    • Ovarian cyst        Allergies   Allergen Reactions   • Amoxicillin Hives        Past Surgical History:   Procedure Laterality Date   • BARTHOLIN GLAND CYST EXCISION     • CERCLAGE CERVIX     • CERVICAL CONE BIOPSY     • WISDOM TOOTH EXTRACTION          Social History     Tobacco Use   • Smoking status: Never Smoker   • Smokeless tobacco: Never Used   Substance Use Topics   • Alcohol use: Never       Family History   Problem Relation Age of Onset   • Thyroid cancer Father         No current outpatient medications on file prior to visit.     Current Facility-Administered Medications on File Prior to Visit   Medication   • methylPREDNISolone sodium succinate (SOLU-Medrol) injection 60 mg       Health Maintenance Due   Topic Date Due   • ANNUAL PHYSICAL  Never done   • COVID-19 Vaccine (1) Never done   • HEPATITIS C SCREENING  Never done       Objective     /83   Pulse 70   Ht 165.1 cm (65\")   Wt 71.2 kg (157 lb)   SpO2 99%   BMI 26.13 kg/m²       Physical Exam  Constitutional:       General: She is not in acute distress.     Appearance: Normal appearance. She is not ill-appearing.   HENT:      Head: Normocephalic and atraumatic.   Cardiovascular:      Rate and Rhythm: Normal rate and regular rhythm.      Heart sounds: Normal heart sounds. No murmur heard.  Pulmonary:      Effort: Pulmonary effort is normal. No respiratory distress.      Breath sounds: Normal breath sounds.   Chest:      Chest wall: No tenderness.   Abdominal:      General: There is no distension.      Palpations: There " is no mass.      Tenderness: There is no abdominal tenderness. There is no guarding.   Musculoskeletal:         General: No swelling or tenderness. Normal range of motion.        Arms:       Cervical back: Normal range of motion and neck supple.      Comments: Grape size palpable, movable, no tender mass to right upper arm   Skin:     General: Skin is warm and dry.      Findings: No rash.   Neurological:      General: No focal deficit present.      Mental Status: She is alert and oriented to person, place, and time. Mental status is at baseline.      Gait: Gait normal.   Psychiatric:         Mood and Affect: Mood normal.         Behavior: Behavior normal.         Thought Content: Thought content normal.         Judgment: Judgment normal.           Result Review :                           Assessment and Plan        Diagnoses and all orders for this visit:    1. Mass of right upper extremity (Primary)  -     US Nonvascular Extremity Limited; Future          I spent 30 minutes caring for Kathy on this date of service. This time includes time spent by me in the following activities:preparing for the visit, obtaining and/or reviewing a separately obtained history, performing a medically appropriate examination and/or evaluation , ordering medications, tests, or procedures, referring and communicating with other health care professionals  and documenting information in the medical record    Follow Up     Return if symptoms worsen or fail to improve.    Patient was given instructions and counseling regarding her condition or for health maintenance advice. Please see specific information pulled into the AVS if appropriate.     Kathy Mccain  reports that she has never smoked. She has never used smokeless tobacco..

## 2022-05-26 ENCOUNTER — TELEPHONE (OUTPATIENT)
Dept: FAMILY MEDICINE CLINIC | Facility: CLINIC | Age: 42
End: 2022-05-26

## 2022-05-26 NOTE — TELEPHONE ENCOUNTER
Caller: Kathy Mccain    Relationship to patient: Self    Best call back number:[phone]    Patient is needing: PATIENT NEEDS TO BE SEEN TODAY HAVING SHARP PAIN IN LEFT SIDE

## 2022-06-08 ENCOUNTER — TELEPHONE (OUTPATIENT)
Dept: FAMILY MEDICINE CLINIC | Facility: CLINIC | Age: 42
End: 2022-06-08

## 2022-06-08 ENCOUNTER — HOSPITAL ENCOUNTER (OUTPATIENT)
Dept: ULTRASOUND IMAGING | Facility: HOSPITAL | Age: 42
Discharge: HOME | End: 2022-06-08
Admitting: NURSE PRACTITIONER

## 2022-06-08 DIAGNOSIS — R22.31 MASS OF RIGHT UPPER EXTREMITY: ICD-10-CM

## 2022-06-08 PROCEDURE — 76882 US LMTD JT/FCL EVL NVASC XTR: CPT

## 2022-06-08 NOTE — TELEPHONE ENCOUNTER
Caller: Kathy Mccain    Relationship: Self    Best call back number: [phone]    What orders are you requesting (i.e. lab or imaging): PELVIC ULTRASOUND    In what timeframe would the patient need to come in: ASAP    Where will you receive your lab/imaging services: [facility]

## 2022-06-08 NOTE — TELEPHONE ENCOUNTER
HUB RELAYED MESSAGE.     PATIENT STATED SHE IS NEEDING ANOTHER ULTRA SOUND    DOCTOR AT URGENT CARE STATED SHE IS NEEDING ANOTHER ULTRA SOUND    PATIENT COMPLETED ULTRA SOUND TODAY 6-8-22    PATIENT IS HAVING PAIN ON SIDE AND WOULD TO CHECK THAT CHECK OUT.    PLEASE ADVISE     CALL BACK [phone]

## 2022-06-08 NOTE — TELEPHONE ENCOUNTER
Attempted to call @ [phone] Mailbox full. Unsure of what pt is needing.     Pt is scheduled today for an US. If pt is asking for the results they are not resulted yet by the radiologist.       OK for HUB if calls back.

## 2022-06-09 ENCOUNTER — TELEMEDICINE (OUTPATIENT)
Dept: FAMILY MEDICINE CLINIC | Facility: CLINIC | Age: 42
End: 2022-06-09

## 2022-06-09 ENCOUNTER — TELEPHONE (OUTPATIENT)
Dept: FAMILY MEDICINE CLINIC | Facility: CLINIC | Age: 42
End: 2022-06-09

## 2022-06-09 VITALS — WEIGHT: 158 LBS | BODY MASS INDEX: 26.33 KG/M2 | HEIGHT: 65 IN

## 2022-06-09 DIAGNOSIS — R10.32 ABDOMINAL PAIN, LLQ: Primary | ICD-10-CM

## 2022-06-09 DIAGNOSIS — D17.21 LIPOMA OF RIGHT UPPER EXTREMITY: ICD-10-CM

## 2022-06-09 PROCEDURE — 99213 OFFICE O/P EST LOW 20 MIN: CPT | Performed by: NURSE PRACTITIONER

## 2022-06-09 NOTE — TELEPHONE ENCOUNTER
----- Message from DELILAH Funk sent at 6/8/2022  3:14 PM EDT -----  Appears to be a lipoma consult general surgery for evaluation

## 2022-06-09 NOTE — PROGRESS NOTES
"You have chosen to receive care through a telehealth visit.  Do you consent to use a video/audio connection for your medical care today? Yes.      Chief Complaint  LLQ pelvic pain    Subjective            Kathy Mccain presents to [facility]  Pt doing Tele Health today to follow up from an  visit on 05/26/22.    Pt was seen for LLQ pelvic pain. Pt was recommended at  to have a pelvic ultrasound done. Pt reports no new symptoms but pain is still present.     Pt had an ultrasound of the right arm yesterday for mass. Results showed possible lipoma. Recommended referral to General Surgery for eval.        Past Medical History:   Diagnosis Date   • Abnormal Pap smear of cervix    • Endometriosis    • Herpes    • Ovarian cyst        Allergies   Allergen Reactions   • Amoxicillin Hives        Past Surgical History:   Procedure Laterality Date   • BARTHOLIN GLAND CYST EXCISION     • CERCLAGE CERVIX     • CERVICAL CONE BIOPSY     • WISDOM TOOTH EXTRACTION          Social History     Tobacco Use   • Smoking status: Never Smoker   • Smokeless tobacco: Never Used   Substance Use Topics   • Alcohol use: Never       Family History   Problem Relation Age of Onset   • Thyroid cancer Father         No current outpatient medications on file prior to visit.     No current facility-administered medications on file prior to visit.       Health Maintenance Due   Topic Date Due   • ANNUAL PHYSICAL  Never done   • COVID-19 Vaccine (1) Never done   • HEPATITIS C SCREENING  Never done       Objective     Ht 165.1 cm (65\")   Wt 71.7 kg (158 lb)   BMI 26.29 kg/m²       Physical Exam  Psychiatric:         Attention and Perception: Attention normal.         Mood and Affect: Mood normal.         Speech: Speech normal.         Behavior: Behavior normal. Behavior is cooperative.         Thought Content: Thought content normal.         Cognition and Memory: Cognition normal.         Judgment: Judgment " normal.           Result Review :                           Assessment and Plan        Diagnoses and all orders for this visit:    1. Abdominal pain, LLQ (Primary)  -     US Pelvis Transvaginal Non OB; Future    2. Lipoma of right upper extremity  -     Ambulatory Referral to General Surgery              Follow Up     Return if symptoms worsen or fail to improve.    Patient was given instructions and counseling regarding her condition or for health maintenance advice. Please see specific information pulled into the AVS if appropriate.     Kathy Mccain  reports that she has never smoked. She has never used smokeless tobacco..

## 2022-07-07 ENCOUNTER — HOSPITAL ENCOUNTER (OUTPATIENT)
Dept: ULTRASOUND IMAGING | Facility: HOSPITAL | Age: 42
Discharge: HOME | End: 2022-07-07
Admitting: NURSE PRACTITIONER

## 2022-07-07 DIAGNOSIS — R10.32 ABDOMINAL PAIN, LLQ: ICD-10-CM

## 2022-07-07 PROCEDURE — 76830 TRANSVAGINAL US NON-OB: CPT

## 2022-07-11 ENCOUNTER — TELEPHONE (OUTPATIENT)
Dept: FAMILY MEDICINE CLINIC | Facility: CLINIC | Age: 42
End: 2022-07-11

## 2022-07-11 DIAGNOSIS — N83.202 BILATERAL OVARIAN CYSTS: Primary | ICD-10-CM

## 2022-07-11 DIAGNOSIS — N83.201 BILATERAL OVARIAN CYSTS: Primary | ICD-10-CM

## 2022-07-11 NOTE — TELEPHONE ENCOUNTER
----- Message from DELILAH Funk sent at 7/8/2022  8:48 AM EDT -----  Cysts possibly noted consult OBGYN to further evaluate

## 2022-07-18 NOTE — PROGRESS NOTES
GYN Visit    CC: Endometriosis    HPI:   41 y.o. Contraception or HRT: Contraception:  None  Menses:   q 28-29 days, lasts 5-6 days, changes products q 2 hrs on heaviest days.   Pain:  Mild, OTC meds control discomfort    Pt has concerns she would like to discuss.          History: PMHx, Meds, Allergies, PSHx, Social Hx, and POBHx all reviewed and updated.  Physical Exam   PHYSICAL EXAM:  /77   Pulse 72   Wt 71.7 kg (158 lb)   LMP 2022 (Approximate)   BMI 26.29 kg/m²   General- NAD, alert and oriented, appropriate  Psych- Normal mood, good memory    PROCEDURE:  US NON-OB TRANSVAGINAL     COMPARISON: [facility], US, US NON-OB TRANSVAGINAL, 2022, 10:56.     INDICATIONS:  Flank pain     TECHNIQUE:    Ultrasound examination of the pelvis was performed, using endovaginal technique.       FINDINGS:          The uterus measures 9.3 cm in length.  The uterus measures 5.6 cm x 5.1 cm in transverse and AP   dimension.  Solitary hypoechoic lesion is seen in the posterior uterine wall on the left measuring   1.5 cm in greatest dimension consistent with a subserosal leiomyoma.  The endometrium is uniformly   echoic.  The endometrial bi layer measures 10 mm.  No cystic intrauterine contents are seen.     The right ovary measures 3.9 cm in greatest dimension, with a volume of 15.7 cc.  Centrally cystic   lesion in the right ovary with relatively thick walls measures 1.9 cm in greatest dimension, and   may represent a resolving cyst.     The left ovary measures 3.3 cm in greatest dimension, with a volume of 9.1 cc.  1.4 cm left ovarian   cyst is probably functional.       IMPRESSION:               Transvaginal pelvic ultrasound, as above.            JABEIR DUBOSE MD         Electronically Signed and Approved By: JABIER DUBOSE MD on 2022 at 16:56       ASSESSMENT AND PLAN:  Diagnoses and all orders for this visit:    1. Abnormal uterine bleeding (AUB)  (Primary)  Overview:  Very heavy for the last year    Assessment & Plan:  At this time patient has no complaints regarding her menstrual cycles        2. Endometriosis  Overview:  Never required treatment after diagnosis  Could be contributing to symptoms now    Assessment & Plan:  Patient has a known history of endometriosis  She had an episode of pelvic pain several weeks ago and had a repeat transvaginal ultrasound  Ultrasound was normal  No further intervention at this time        Counseling: ENDOMETRIOSIS- Dx, incidence, familial tendency, recurrence, periods/pain/dyspareunia, pregnancy, risk of other pain syndromes. Tx options wrt pt hx NLT expectant, hormonal (BC, IUD, Lupron, Orilissa, others), outpt dx L/S, hyst +/- BSO.        Follow Up:  Return for Annual physical.          Hemalatha Prado MD  07/20/2022

## 2022-07-20 ENCOUNTER — OFFICE VISIT (OUTPATIENT)
Dept: OBSTETRICS AND GYNECOLOGY | Facility: CLINIC | Age: 42
End: 2022-07-20

## 2022-07-20 VITALS
HEART RATE: 72 BPM | DIASTOLIC BLOOD PRESSURE: 77 MMHG | SYSTOLIC BLOOD PRESSURE: 117 MMHG | BODY MASS INDEX: 26.29 KG/M2 | WEIGHT: 158 LBS

## 2022-07-20 DIAGNOSIS — N80.9 ENDOMETRIOSIS: ICD-10-CM

## 2022-07-20 DIAGNOSIS — N93.9 ABNORMAL UTERINE BLEEDING (AUB): Primary | ICD-10-CM

## 2022-07-20 PROCEDURE — 99212 OFFICE O/P EST SF 10 MIN: CPT | Performed by: OBSTETRICS & GYNECOLOGY

## 2022-07-20 NOTE — ASSESSMENT & PLAN NOTE
Patient has a known history of endometriosis  She had an episode of pelvic pain several weeks ago and had a repeat transvaginal ultrasound  Ultrasound was normal  No further intervention at this time

## 2022-12-20 ENCOUNTER — OFFICE VISIT (OUTPATIENT)
Dept: FAMILY MEDICINE CLINIC | Facility: CLINIC | Age: 42
End: 2022-12-20

## 2022-12-20 VITALS
DIASTOLIC BLOOD PRESSURE: 68 MMHG | WEIGHT: 167 LBS | OXYGEN SATURATION: 100 % | BODY MASS INDEX: 27.82 KG/M2 | HEIGHT: 65 IN | SYSTOLIC BLOOD PRESSURE: 121 MMHG | HEART RATE: 72 BPM

## 2022-12-20 DIAGNOSIS — H66.90 ACUTE OTITIS MEDIA, UNSPECIFIED OTITIS MEDIA TYPE: Primary | ICD-10-CM

## 2022-12-20 PROCEDURE — 99213 OFFICE O/P EST LOW 20 MIN: CPT | Performed by: NURSE PRACTITIONER

## 2022-12-20 RX ORDER — CEFDINIR 300 MG/1
300 CAPSULE ORAL 2 TIMES DAILY
Qty: 20 CAPSULE | Refills: 0 | Status: SHIPPED | OUTPATIENT
Start: 2022-12-20 | End: 2023-03-28

## 2022-12-20 RX ORDER — METHYLPREDNISOLONE 4 MG/1
TABLET ORAL
Qty: 1 EACH | Refills: 0 | Status: SHIPPED | OUTPATIENT
Start: 2022-12-20 | End: 2023-03-28

## 2022-12-20 NOTE — PROGRESS NOTES
"Chief Complaint  Ear Fullness and sinus pressure    SUBJECTIVE  Kathy Mccain presents to [facility]     Pt here today for sinus and pressure.  States has been having some left ear fullness and off-and-on pain for couple of weeks.  No fever      History of Present Illness  Past Medical History:   Diagnosis Date   • Abnormal Pap smear of cervix    • Endometriosis    • Herpes    • Ovarian cyst       Family History   Problem Relation Age of Onset   • Thyroid cancer Father       Past Surgical History:   Procedure Laterality Date   • BARTHOLIN GLAND CYST EXCISION     • CERCLAGE CERVIX     • CERVICAL CONE BIOPSY     • WISDOM TOOTH EXTRACTION          Current Outpatient Medications:   •  cefdinir (OMNICEF) 300 MG capsule, Take 1 capsule by mouth 2 (Two) Times a Day., Disp: 20 capsule, Rfl: 0  •  methylPREDNISolone (MEDROL) 4 MG dose pack, Take as directed on package instructions., Disp: 1 each, Rfl: 0    OBJECTIVE  Vital Signs:   /68   Pulse 72   Ht 165.1 cm (65\")   Wt 75.8 kg (167 lb)   SpO2 100%   BMI 27.79 kg/m²    Estimated body mass index is 27.79 kg/m² as calculated from the following:    Height as of this encounter: 165.1 cm (65\").    Weight as of this encounter: 75.8 kg (167 lb).     Wt Readings from Last 3 Encounters:   12/20/22 75.8 kg (167 lb)   07/20/22 71.7 kg (158 lb)   06/09/22 71.7 kg (158 lb)     BP Readings from Last 3 Encounters:   12/20/22 121/68   07/20/22 117/77   05/26/22 121/69       Physical Exam  Vitals reviewed.   Constitutional:       Appearance: Normal appearance. She is well-developed.   HENT:      Head: Normocephalic and atraumatic.      Right Ear: Tympanic membrane, ear canal and external ear normal.      Left Ear: Ear canal and external ear normal.      Ears:      Comments: Left TM erythematous, fluid noted      Nose:      Comments: Sinus mildly tender to palpation  Eyes:      Conjunctiva/sclera: Conjunctivae normal.      Pupils: Pupils are " equal, round, and reactive to light.   Cardiovascular:      Rate and Rhythm: Normal rate and regular rhythm.      Heart sounds: No murmur heard.    No friction rub. No gallop.   Pulmonary:      Effort: Pulmonary effort is normal.      Breath sounds: Normal breath sounds. No wheezing or rhonchi.   Skin:     General: Skin is warm and dry.   Neurological:      Mental Status: She is alert and oriented to person, place, and time.      Cranial Nerves: No cranial nerve deficit.   Psychiatric:         Mood and Affect: Mood and affect normal.         Behavior: Behavior normal.         Thought Content: Thought content normal.         Judgment: Judgment normal.          Result Review        No Images in the past 120 days found..     The above data has been reviewed by DELILAH Godinez 12/20/2022 13:58 EST.          Patient Care Team:  Jeny Littlejohn APRN as PCP - General (Nurse Practitioner)        ASSESSMENT & PLAN    Diagnoses and all orders for this visit:    1. Acute otitis media, unspecified otitis media type (Primary)  -     methylPREDNISolone (MEDROL) 4 MG dose pack; Take as directed on package instructions.  Dispense: 1 each; Refill: 0  -     cefdinir (OMNICEF) 300 MG capsule; Take 1 capsule by mouth 2 (Two) Times a Day.  Dispense: 20 capsule; Refill: 0    Discussed amoxicillin allergy with patient, she reports that she had a rash, discussed possible cephalosporin cross sensitivity, states that she believes that she has taken Omnicef in the past and tolerated without issue.     Tobacco Use: Low Risk    • Smoking Tobacco Use: Never   • Smokeless Tobacco Use: Never   • Passive Exposure: Not on file       Follow Up     Return if symptoms worsen or fail to improve.        Patient was given instructions and counseling regarding her condition or for health maintenance advice. Please see specific information pulled into the AVS if appropriate.   I have reviewed information obtained and documented by others and I have  confirmed the accuracy of this documented note.    Raiza Wilson, APRN

## 2023-03-24 ENCOUNTER — TELEPHONE (OUTPATIENT)
Dept: FAMILY MEDICINE CLINIC | Facility: CLINIC | Age: 43
End: 2023-03-24

## 2023-03-24 NOTE — TELEPHONE ENCOUNTER
Caller: Kathy Mccain    Relationship to patient: Self    Best call back number: [phone]    Chief complaint: URINARY PAIN AND STRONG ODOR    Requested date: ASAP    If rescheduling, when is the original appointment: 3/30/23

## 2023-03-28 ENCOUNTER — OFFICE VISIT (OUTPATIENT)
Dept: FAMILY MEDICINE CLINIC | Facility: CLINIC | Age: 43
End: 2023-03-28
Payer: COMMERCIAL

## 2023-03-28 VITALS
WEIGHT: 166 LBS | BODY MASS INDEX: 27.66 KG/M2 | DIASTOLIC BLOOD PRESSURE: 75 MMHG | SYSTOLIC BLOOD PRESSURE: 113 MMHG | HEART RATE: 72 BPM | OXYGEN SATURATION: 99 % | HEIGHT: 65 IN

## 2023-03-28 DIAGNOSIS — R10.2 PELVIC PAIN: ICD-10-CM

## 2023-03-28 DIAGNOSIS — R63.1 POLYDIPSIA: ICD-10-CM

## 2023-03-28 DIAGNOSIS — Z11.59 NEED FOR HEPATITIS C SCREENING TEST: ICD-10-CM

## 2023-03-28 DIAGNOSIS — Z13.29 SCREENING FOR THYROID DISORDER: ICD-10-CM

## 2023-03-28 DIAGNOSIS — Z13.220 SCREENING FOR CHOLESTEROL LEVEL: ICD-10-CM

## 2023-03-28 DIAGNOSIS — R82.90 URINE MALODOR: ICD-10-CM

## 2023-03-28 DIAGNOSIS — Z00.00 ANNUAL PHYSICAL EXAM: Primary | ICD-10-CM

## 2023-03-28 DIAGNOSIS — Z12.31 ENCOUNTER FOR SCREENING MAMMOGRAM FOR MALIGNANT NEOPLASM OF BREAST: ICD-10-CM

## 2023-03-28 DIAGNOSIS — R30.0 DYSURIA: ICD-10-CM

## 2023-03-28 LAB
BILIRUB BLD-MCNC: NEGATIVE MG/DL
CLARITY, POC: CLEAR
COLOR UR: YELLOW
EXPIRATION DATE: NORMAL
GLUCOSE UR STRIP-MCNC: NEGATIVE MG/DL
KETONES UR QL: NEGATIVE
LEUKOCYTE EST, POC: NEGATIVE
Lab: NORMAL
NITRITE UR-MCNC: NEGATIVE MG/ML
PH UR: 5.5 [PH] (ref 5–8)
PROT UR STRIP-MCNC: NEGATIVE MG/DL
RBC # UR STRIP: NEGATIVE /UL
SP GR UR: 1 (ref 1–1.03)
UROBILINOGEN UR QL: NORMAL

## 2023-03-28 PROCEDURE — 87077 CULTURE AEROBIC IDENTIFY: CPT | Performed by: NURSE PRACTITIONER

## 2023-03-28 PROCEDURE — 87086 URINE CULTURE/COLONY COUNT: CPT | Performed by: NURSE PRACTITIONER

## 2023-03-28 PROCEDURE — 87186 SC STD MICRODIL/AGAR DIL: CPT | Performed by: NURSE PRACTITIONER

## 2023-03-28 RX ORDER — MULTIPLE VITAMINS W/ MINERALS TAB 9MG-400MCG
1 TAB ORAL DAILY
COMMUNITY

## 2023-03-28 NOTE — PROGRESS NOTES
Chief Complaint  Urinary Frequency, Malodorous Urine, Pelvic Pain, and Polydipsia/ Annual physical    Subjective            Kathy Mccain presents to [facility]  History of Present Illness  Pt is an annual CPE. Pt has c/o possible UTI. Pt has been having malodorous urine and urinary frequency, and pelvic pain. Pt reports the pain is mainly on the (L) side but at times will hurt on the (R). Pt states this has been ongoing for 2 mo. Pt states she has also been having extreme thirst.  She denies any hematuria.    PT does she venkatesh Prado for GYN care.    Pt is due labs.    Pt declines vaccines at this time and understands the risks of not having.    Pt is due mammogram.     PAP 2021        Past Medical History:   Diagnosis Date   • Abnormal Pap smear of cervix    • Endometriosis    • Herpes    • Ovarian cyst        Allergies   Allergen Reactions   • Amoxicillin Hives        Past Surgical History:   Procedure Laterality Date   • BARTHOLIN GLAND CYST EXCISION     • CERCLAGE CERVIX     • CERVICAL CONE BIOPSY     • WISDOM TOOTH EXTRACTION          Social History     Tobacco Use   • Smoking status: Never   • Smokeless tobacco: Never   Substance Use Topics   • Alcohol use: Never       Family History   Problem Relation Age of Onset   • Thyroid cancer Father         Current Outpatient Medications on File Prior to Visit   Medication Sig   • multivitamin with minerals tablet tablet Take 1 tablet by mouth Daily.   • [DISCONTINUED] cefdinir (OMNICEF) 300 MG capsule Take 1 capsule by mouth 2 (Two) Times a Day. (Patient not taking: Reported on 3/28/2023)   • [DISCONTINUED] methylPREDNISolone (MEDROL) 4 MG dose pack Take as directed on package instructions. (Patient not taking: Reported on 3/28/2023)     No current facility-administered medications on file prior to visit.       Health Maintenance Due   Topic Date Due   • HEPATITIS C SCREENING  Never done   • ANNUAL PHYSICAL  Never done  "      Objective     /75   Pulse 72   Ht 165.1 cm (65\")   Wt 75.3 kg (166 lb)   SpO2 99%   BMI 27.62 kg/m²       Physical Exam  Constitutional:       General: She is not in acute distress.     Appearance: Normal appearance. She is not ill-appearing.   HENT:      Head: Normocephalic and atraumatic.      Right Ear: Tympanic membrane, ear canal and external ear normal.      Left Ear: Tympanic membrane, ear canal and external ear normal.      Nose: Nose normal.   Cardiovascular:      Rate and Rhythm: Normal rate and regular rhythm.      Heart sounds: Normal heart sounds. No murmur heard.  Pulmonary:      Effort: Pulmonary effort is normal. No respiratory distress.      Breath sounds: Normal breath sounds.   Chest:      Chest wall: No tenderness.   Abdominal:      General: Abdomen is flat. Bowel sounds are normal. There is no distension.      Palpations: Abdomen is soft. There is no mass.      Tenderness: There is no abdominal tenderness. There is no guarding.   Musculoskeletal:         General: No swelling or tenderness. Normal range of motion.      Cervical back: Normal range of motion and neck supple.   Skin:     General: Skin is warm and dry.      Findings: No rash.   Neurological:      General: No focal deficit present.      Mental Status: She is alert and oriented to person, place, and time. Mental status is at baseline.      Gait: Gait normal.   Psychiatric:         Mood and Affect: Mood normal.         Behavior: Behavior normal.         Thought Content: Thought content normal.         Judgment: Judgment normal.           Result Review :                           Assessment and Plan        Diagnoses and all orders for this visit:    1. Annual physical exam (Primary)  -     CBC w AUTO Differential; Future  -     Comprehensive metabolic panel; Future  -     Lipid panel; Future  -     TSH; Future  -     T4, free; Future  -     Mammo Screening Digital Tomosynthesis Bilateral With CAD; Future  -     Hepatitis " panel, acute; Future    2. Dysuria  -     POCT urinalysis dipstick, automated  -     Urine Culture - Urine, Urine, Clean Catch; Future  -     Urine Culture - Urine, Urine, Clean Catch    3. Urine malodor  -     POCT urinalysis dipstick, automated  -     Urine Culture - Urine, Urine, Clean Catch; Future  -     Urine Culture - Urine, Urine, Clean Catch    4. Encounter for screening mammogram for malignant neoplasm of breast  -     Mammo Screening Digital Tomosynthesis Bilateral With CAD; Future    5. Need for hepatitis C screening test  -     Hepatitis panel, acute; Future    6. Screening for thyroid disorder  -     TSH; Future  -     T4, free; Future    7. Screening for cholesterol level  -     Comprehensive metabolic panel; Future  -     Lipid panel; Future    8. Pelvic pain  Comments:  will consult her GYN, Hemalatha Prado if pelvic pain continues and pelvic US is resulted  Orders:  -     US Pelvis Complete; Future    9. Polydipsia  -     Hemoglobin A1c; Future    Preventative Counseling:  Healthy diet  Daily exercise  Get adequate sleep          Follow Up     Return in about 1 year (around 3/28/2024), or if symptoms worsen or fail to improve, for Annual physical.    Patient was given instructions and counseling regarding her condition or for health maintenance advice. Please see specific information pulled into the AVS if appropriate.     Kathy Mccain  reports that she has never smoked. She has never used smokeless tobacco..

## 2023-03-29 ENCOUNTER — LAB (OUTPATIENT)
Dept: LAB | Facility: HOSPITAL | Age: 43
End: 2023-03-29
Payer: COMMERCIAL

## 2023-03-29 DIAGNOSIS — R63.1 POLYDIPSIA: ICD-10-CM

## 2023-03-29 DIAGNOSIS — Z13.220 SCREENING FOR CHOLESTEROL LEVEL: ICD-10-CM

## 2023-03-29 DIAGNOSIS — Z00.00 ANNUAL PHYSICAL EXAM: ICD-10-CM

## 2023-03-29 DIAGNOSIS — Z13.29 SCREENING FOR THYROID DISORDER: ICD-10-CM

## 2023-03-29 DIAGNOSIS — Z11.59 NEED FOR HEPATITIS C SCREENING TEST: ICD-10-CM

## 2023-03-29 LAB
ALBUMIN SERPL-MCNC: 4.2 G/DL (ref 3.5–5.2)
ALBUMIN/GLOB SERPL: 1.6 G/DL
ALP SERPL-CCNC: 57 U/L (ref 39–117)
ALT SERPL W P-5'-P-CCNC: 33 U/L (ref 1–33)
ANION GAP SERPL CALCULATED.3IONS-SCNC: 8 MMOL/L (ref 5–15)
AST SERPL-CCNC: 27 U/L (ref 1–32)
BASOPHILS # BLD AUTO: 0.04 10*3/MM3 (ref 0–0.2)
BASOPHILS NFR BLD AUTO: 0.7 % (ref 0–1.5)
BILIRUB SERPL-MCNC: 0.3 MG/DL (ref 0–1.2)
BUN SERPL-MCNC: 11 MG/DL (ref 6–20)
BUN/CREAT SERPL: 12.2 (ref 7–25)
CALCIUM SPEC-SCNC: 9.2 MG/DL (ref 8.6–10.5)
CHLORIDE SERPL-SCNC: 106 MMOL/L (ref 98–107)
CHOLEST SERPL-MCNC: 145 MG/DL (ref 0–200)
CO2 SERPL-SCNC: 26 MMOL/L (ref 22–29)
CREAT SERPL-MCNC: 0.9 MG/DL (ref 0.57–1)
DEPRECATED RDW RBC AUTO: 41.6 FL (ref 37–54)
EGFRCR SERPLBLD CKD-EPI 2021: 82.5 ML/MIN/1.73
EOSINOPHIL # BLD AUTO: 0.11 10*3/MM3 (ref 0–0.4)
EOSINOPHIL NFR BLD AUTO: 2 % (ref 0.3–6.2)
ERYTHROCYTE [DISTWIDTH] IN BLOOD BY AUTOMATED COUNT: 12.7 % (ref 12.3–15.4)
GLOBULIN UR ELPH-MCNC: 2.6 GM/DL
GLUCOSE SERPL-MCNC: 91 MG/DL (ref 65–99)
HAV IGM SERPL QL IA: NORMAL
HBA1C MFR BLD: 4.7 % (ref 4.8–5.6)
HBV CORE IGM SERPL QL IA: NORMAL
HBV SURFACE AG SERPL QL IA: NORMAL
HCT VFR BLD AUTO: 38.7 % (ref 34–46.6)
HCV AB SER DONR QL: NORMAL
HDLC SERPL-MCNC: 48 MG/DL (ref 40–60)
HGB BLD-MCNC: 13.2 G/DL (ref 12–15.9)
IMM GRANULOCYTES # BLD AUTO: 0.01 10*3/MM3 (ref 0–0.05)
IMM GRANULOCYTES NFR BLD AUTO: 0.2 % (ref 0–0.5)
LDLC SERPL CALC-MCNC: 84 MG/DL (ref 0–100)
LDLC/HDLC SERPL: 1.76 {RATIO}
LYMPHOCYTES # BLD AUTO: 1.94 10*3/MM3 (ref 0.7–3.1)
LYMPHOCYTES NFR BLD AUTO: 35.8 % (ref 19.6–45.3)
MCH RBC QN AUTO: 31.2 PG (ref 26.6–33)
MCHC RBC AUTO-ENTMCNC: 34.1 G/DL (ref 31.5–35.7)
MCV RBC AUTO: 91.5 FL (ref 79–97)
MONOCYTES # BLD AUTO: 0.42 10*3/MM3 (ref 0.1–0.9)
MONOCYTES NFR BLD AUTO: 7.7 % (ref 5–12)
NEUTROPHILS NFR BLD AUTO: 2.9 10*3/MM3 (ref 1.7–7)
NEUTROPHILS NFR BLD AUTO: 53.6 % (ref 42.7–76)
NRBC BLD AUTO-RTO: 0 /100 WBC (ref 0–0.2)
PLATELET # BLD AUTO: 266 10*3/MM3 (ref 140–450)
PMV BLD AUTO: 10 FL (ref 6–12)
POTASSIUM SERPL-SCNC: 4.1 MMOL/L (ref 3.5–5.2)
PROT SERPL-MCNC: 6.8 G/DL (ref 6–8.5)
RBC # BLD AUTO: 4.23 10*6/MM3 (ref 3.77–5.28)
SODIUM SERPL-SCNC: 140 MMOL/L (ref 136–145)
T4 FREE SERPL-MCNC: 1.19 NG/DL (ref 0.93–1.7)
TRIGL SERPL-MCNC: 63 MG/DL (ref 0–150)
TSH SERPL DL<=0.05 MIU/L-ACNC: 1.54 UIU/ML (ref 0.27–4.2)
VLDLC SERPL-MCNC: 13 MG/DL (ref 5–40)
WBC NRBC COR # BLD: 5.42 10*3/MM3 (ref 3.4–10.8)

## 2023-03-29 PROCEDURE — 83036 HEMOGLOBIN GLYCOSYLATED A1C: CPT

## 2023-03-29 PROCEDURE — 80061 LIPID PANEL: CPT

## 2023-03-29 PROCEDURE — 36415 COLL VENOUS BLD VENIPUNCTURE: CPT

## 2023-03-29 PROCEDURE — 80074 ACUTE HEPATITIS PANEL: CPT

## 2023-03-29 PROCEDURE — 80050 GENERAL HEALTH PANEL: CPT

## 2023-03-29 PROCEDURE — 84439 ASSAY OF FREE THYROXINE: CPT

## 2023-03-29 RX ORDER — CIPROFLOXACIN 500 MG/1
500 TABLET, FILM COATED ORAL 2 TIMES DAILY
Qty: 10 TABLET | Refills: 0 | Status: SHIPPED | OUTPATIENT
Start: 2023-03-29 | End: 2023-04-03

## 2023-03-30 LAB — BACTERIA SPEC AEROBE CULT: ABNORMAL

## 2023-03-30 RX ORDER — SULFAMETHOXAZOLE AND TRIMETHOPRIM 800; 160 MG/1; MG/1
1 TABLET ORAL 2 TIMES DAILY
Qty: 20 TABLET | Refills: 0 | Status: SHIPPED | OUTPATIENT
Start: 2023-03-30

## 2023-04-12 ENCOUNTER — HOSPITAL ENCOUNTER (OUTPATIENT)
Dept: MAMMOGRAPHY | Facility: HOSPITAL | Age: 43
Discharge: HOME | End: 2023-04-12
Payer: COMMERCIAL

## 2023-04-12 ENCOUNTER — HOSPITAL ENCOUNTER (OUTPATIENT)
Dept: ULTRASOUND IMAGING | Facility: HOSPITAL | Age: 43
Discharge: HOME | End: 2023-04-12
Payer: COMMERCIAL

## 2023-04-12 DIAGNOSIS — Z12.31 ENCOUNTER FOR SCREENING MAMMOGRAM FOR MALIGNANT NEOPLASM OF BREAST: ICD-10-CM

## 2023-04-12 DIAGNOSIS — Z00.00 ANNUAL PHYSICAL EXAM: ICD-10-CM

## 2023-04-12 DIAGNOSIS — R10.2 PELVIC PAIN: ICD-10-CM

## 2023-04-12 PROCEDURE — 77063 BREAST TOMOSYNTHESIS BI: CPT

## 2023-04-12 PROCEDURE — 76856 US EXAM PELVIC COMPLETE: CPT

## 2023-04-12 PROCEDURE — 77067 SCR MAMMO BI INCL CAD: CPT

## 2023-11-10 ENCOUNTER — TRANSCRIBE ORDERS (OUTPATIENT)
Dept: ADMINISTRATIVE | Facility: HOSPITAL | Age: 43
End: 2023-11-10
Payer: COMMERCIAL

## 2023-11-10 DIAGNOSIS — N93.9 UTERUS BLEEDING: Primary | ICD-10-CM

## 2023-11-10 DIAGNOSIS — N80.00 ENDOMETRIOSIS, UTERUS: ICD-10-CM

## 2025-06-09 ENCOUNTER — TRANSCRIBE ORDERS (OUTPATIENT)
Dept: ADMINISTRATIVE | Facility: HOSPITAL | Age: 45
End: 2025-06-09
Payer: COMMERCIAL

## 2025-06-09 DIAGNOSIS — Z12.31 BREAST CANCER SCREENING BY MAMMOGRAM: Primary | ICD-10-CM

## 2025-07-17 ENCOUNTER — PREP FOR SURGERY (OUTPATIENT)
Dept: OTHER | Facility: HOSPITAL | Age: 45
End: 2025-07-17
Payer: COMMERCIAL

## 2025-07-17 ENCOUNTER — OFFICE VISIT (OUTPATIENT)
Dept: SURGERY | Facility: CLINIC | Age: 45
End: 2025-07-17
Payer: COMMERCIAL

## 2025-07-17 VITALS
HEART RATE: 75 BPM | WEIGHT: 165.12 LBS | HEIGHT: 65 IN | OXYGEN SATURATION: 99 % | DIASTOLIC BLOOD PRESSURE: 89 MMHG | BODY MASS INDEX: 27.51 KG/M2 | SYSTOLIC BLOOD PRESSURE: 132 MMHG

## 2025-07-17 DIAGNOSIS — R10.9 LEFT SIDED ABDOMINAL PAIN: Primary | ICD-10-CM

## 2025-07-17 DIAGNOSIS — K59.00 CONSTIPATION, UNSPECIFIED CONSTIPATION TYPE: ICD-10-CM

## 2025-07-17 RX ORDER — SODIUM CHLORIDE 0.9 % (FLUSH) 0.9 %
3 SYRINGE (ML) INJECTION EVERY 12 HOURS SCHEDULED
OUTPATIENT
Start: 2025-07-17

## 2025-07-17 RX ORDER — SODIUM CHLORIDE 9 MG/ML
40 INJECTION, SOLUTION INTRAVENOUS AS NEEDED
OUTPATIENT
Start: 2025-07-17

## 2025-07-17 RX ORDER — SODIUM CHLORIDE 0.9 % (FLUSH) 0.9 %
10 SYRINGE (ML) INJECTION AS NEEDED
OUTPATIENT
Start: 2025-07-17

## 2025-07-17 RX ORDER — POLYETHYLENE GLYCOL 3350 17 G/17G
POWDER, FOR SOLUTION ORAL
Qty: 238 PACKET | Refills: 0 | Status: SHIPPED | OUTPATIENT
Start: 2025-07-17

## 2025-07-17 NOTE — PROGRESS NOTES
Chief Complaint: Abdominal Pain    Subjective      I am having abdominal pain       History of Present Illness  Kathy Mccain is a 44 y.o. female presents to [facility] for abdominal pain.    Patient presents today on referral from Mackenzie Sosa for abdominal pain.  Patient reports that she has been having some left-sided abdominal pain associated with constipation over the last several months.  Patient has had a couple abdominal surgeries and is wondering if her abdominal pain is related to scar tissue.  She denies any rectal bleeding or change in bowel habit.  Denies any family history of colorectal cancer.  Reports her mother always has polyps removed.    Denies KATE.  Denies any cardiac issues.  Denies take any GLP-1 receptors.      Objective     Past Medical History:   Diagnosis Date    Abnormal Pap smear of cervix     Endometriosis     Herpes     Ovarian cyst        Past Surgical History:   Procedure Laterality Date    BARTHOLIN GLAND CYST EXCISION      CERCLAGE CERVIX      CERVICAL CONE BIOPSY      WISDOM TOOTH EXTRACTION         Outpatient Medications Marked as Taking for the 7/17/25 encounter (Office Visit) with Sylwia Moran, APRCANDE   Medication Sig Dispense Refill    multivitamin with minerals tablet tablet Take 1 tablet by mouth Daily.         Allergies   Allergen Reactions    Amoxicillin Hives        Family History   Problem Relation Age of Onset    Thyroid cancer Father        Social History     Socioeconomic History    Marital status:    Tobacco Use    Smoking status: Never    Smokeless tobacco: Never   Vaping Use    Vaping status: Never Used   Substance and Sexual Activity    Alcohol use: Never    Drug use: Never    Sexual activity: Yes     Partners: Male     Birth control/protection: None       Review of Systems   Constitutional:  Negative for chills and fever.   Gastrointestinal:  Positive for abdominal pain and constipation. Negative for abdominal  "distention, anal bleeding, blood in stool, diarrhea and rectal pain.        Vital Signs:   /89 (BP Location: Left arm, Patient Position: Sitting, Cuff Size: Adult)   Pulse 75   Ht 165.1 cm (65\")   Wt 74.9 kg (165 lb 2 oz)   SpO2 99%   BMI 27.48 kg/m²      Physical Exam  Vitals and nursing note reviewed.   Constitutional:       General: She is not in acute distress.     Appearance: Normal appearance. She is not ill-appearing.   HENT:      Head: Normocephalic and atraumatic.   Cardiovascular:      Rate and Rhythm: Normal rate.   Pulmonary:      Effort: Pulmonary effort is normal.      Breath sounds: No stridor.   Abdominal:      Palpations: Abdomen is soft.      Tenderness: There is no guarding.   Musculoskeletal:         General: No deformity. Normal range of motion.   Skin:     General: Skin is warm and dry.      Coloration: Skin is not jaundiced.   Neurological:      General: No focal deficit present.      Mental Status: She is alert and oriented to person, place, and time.   Psychiatric:         Mood and Affect: Mood normal.         Thought Content: Thought content normal.          Result Review :          []  Laboratory  []  Radiology  []  Pathology  []  Microbiology  []  EKG/Telemetry   []  Cardiology/Vascular   []  Old records  I spent 15 minutes caring for Kathy on this date of service. This time includes time spent by me in the following activities: preparing for the visit, reviewing tests, obtaining and/or reviewing a separately obtained history, performing a medically appropriate examination and/or evaluation, counseling and educating the patient/family/caregiver, ordering medications, tests, or procedures, referring and communicating with other health care professionals, documenting information in the medical record, independently interpreting results and communicating that information with the patient/family/caregiver, and care coordination       Assessment and Plan    Diagnoses and all orders " for this visit:    1. Left sided abdominal pain (Primary)    2. Constipation, unspecified constipation type    Other orders  -     polyethylene glycol (MIRALAX) 17 g packet; Take as directed.  Instructions given in office.  Dispense: 238 g bottle  Dispense: 238 packet; Refill: 0        Follow Up   Return for Schedule colonoscopy with Dr. Meredith on 8/27/2025 at [facility].    Hospital arrival time:12:30    Possible risks/complications, benefits, and alternatives to surgical or invasive procedures have been explained to patient and/or legal guardian.    Patient has been evaluated and can tolerate anesthesia and/or sedation. Risks, benefits, and alternatives to anesthesia and sedation have been explained to the patient and/or legal guardian. Patient verbalizes understanding and is willing to proceed with the above plan.     Patient was given instructions and counseling regarding her condition or for health maintenance advice. Please see specific information pulled into the AVS if appropriate.     As always, it has been a pleasure to participate in your patient's care. Please call with questions or concerns.

## 2025-08-06 ENCOUNTER — HOSPITAL ENCOUNTER (OUTPATIENT)
Dept: MAMMOGRAPHY | Facility: HOSPITAL | Age: 45
Discharge: HOME | End: 2025-08-06
Admitting: NURSE PRACTITIONER
Payer: COMMERCIAL

## 2025-08-06 DIAGNOSIS — Z12.31 BREAST CANCER SCREENING BY MAMMOGRAM: ICD-10-CM

## 2025-08-06 PROCEDURE — 77067 SCR MAMMO BI INCL CAD: CPT

## 2025-08-06 PROCEDURE — 77063 BREAST TOMOSYNTHESIS BI: CPT

## 2025-08-26 ENCOUNTER — ANESTHESIA EVENT (OUTPATIENT)
Dept: GASTROENTEROLOGY | Facility: HOSPITAL | Age: 45
End: 2025-08-26
Payer: COMMERCIAL

## 2025-08-27 ENCOUNTER — HOSPITAL ENCOUNTER (OUTPATIENT)
Dept: ULTRASOUND IMAGING | Facility: HOSPITAL | Age: 45
Discharge: HOME | End: 2025-08-27
Payer: COMMERCIAL

## 2025-08-27 ENCOUNTER — HOSPITAL ENCOUNTER (OUTPATIENT)
Facility: HOSPITAL | Age: 45
Setting detail: HOSPITAL OUTPATIENT SURGERY
Discharge: HOME | End: 2025-08-27
Attending: SURGERY | Admitting: SURGERY
Payer: COMMERCIAL

## 2025-08-27 ENCOUNTER — HOSPITAL ENCOUNTER (OUTPATIENT)
Dept: MAMMOGRAPHY | Facility: HOSPITAL | Age: 45
Discharge: HOME | End: 2025-08-27
Payer: COMMERCIAL

## 2025-08-27 ENCOUNTER — ANESTHESIA (OUTPATIENT)
Dept: GASTROENTEROLOGY | Facility: HOSPITAL | Age: 45
End: 2025-08-27
Payer: COMMERCIAL

## 2025-08-27 VITALS
RESPIRATION RATE: 19 BRPM | TEMPERATURE: 97.8 F | DIASTOLIC BLOOD PRESSURE: 83 MMHG | SYSTOLIC BLOOD PRESSURE: 134 MMHG | WEIGHT: 162.7 LBS | BODY MASS INDEX: 27.07 KG/M2 | HEART RATE: 58 BPM | OXYGEN SATURATION: 99 %

## 2025-08-27 DIAGNOSIS — N60.02 CYST OF LEFT BREAST: ICD-10-CM

## 2025-08-27 DIAGNOSIS — R10.9 LEFT SIDED ABDOMINAL PAIN: ICD-10-CM

## 2025-08-27 LAB — HCG SERPL QL: NEGATIVE

## 2025-08-27 PROCEDURE — 25010000002 PROPOFOL 10 MG/ML EMULSION

## 2025-08-27 PROCEDURE — 25010000002 LIDOCAINE PF 2% 2 % SOLUTION

## 2025-08-27 PROCEDURE — 25810000003 LACTATED RINGERS PER 1000 ML

## 2025-08-27 PROCEDURE — 84703 CHORIONIC GONADOTROPIN ASSAY: CPT | Performed by: NURSE PRACTITIONER

## 2025-08-27 PROCEDURE — 76642 ULTRASOUND BREAST LIMITED: CPT

## 2025-08-27 RX ORDER — ERGOCALCIFEROL 1.25 MG/1
50000 CAPSULE, LIQUID FILLED ORAL WEEKLY
COMMUNITY

## 2025-08-27 RX ORDER — SODIUM CHLORIDE 0.9 % (FLUSH) 0.9 %
3 SYRINGE (ML) INJECTION EVERY 12 HOURS SCHEDULED
Status: DISCONTINUED | OUTPATIENT
Start: 2025-08-27 | End: 2025-08-27 | Stop reason: HOSPADM

## 2025-08-27 RX ORDER — SODIUM CHLORIDE 0.9 % (FLUSH) 0.9 %
10 SYRINGE (ML) INJECTION AS NEEDED
Status: DISCONTINUED | OUTPATIENT
Start: 2025-08-27 | End: 2025-08-27 | Stop reason: HOSPADM

## 2025-08-27 RX ORDER — SODIUM CHLORIDE, SODIUM LACTATE, POTASSIUM CHLORIDE, CALCIUM CHLORIDE 600; 310; 30; 20 MG/100ML; MG/100ML; MG/100ML; MG/100ML
30 INJECTION, SOLUTION INTRAVENOUS CONTINUOUS
Status: DISCONTINUED | OUTPATIENT
Start: 2025-08-27 | End: 2025-08-27 | Stop reason: HOSPADM

## 2025-08-27 RX ORDER — PROPOFOL 10 MG/ML
VIAL (ML) INTRAVENOUS AS NEEDED
Status: DISCONTINUED | OUTPATIENT
Start: 2025-08-27 | End: 2025-08-27 | Stop reason: SURG

## 2025-08-27 RX ORDER — LIDOCAINE HYDROCHLORIDE 20 MG/ML
INJECTION, SOLUTION EPIDURAL; INFILTRATION; INTRACAUDAL; PERINEURAL AS NEEDED
Status: DISCONTINUED | OUTPATIENT
Start: 2025-08-27 | End: 2025-08-27 | Stop reason: SURG

## 2025-08-27 RX ORDER — SODIUM CHLORIDE 9 MG/ML
40 INJECTION, SOLUTION INTRAVENOUS AS NEEDED
Status: DISCONTINUED | OUTPATIENT
Start: 2025-08-27 | End: 2025-08-27 | Stop reason: HOSPADM

## 2025-08-27 RX ADMIN — PROPOFOL 200 MCG/KG/MIN: 10 INJECTION, EMULSION INTRAVENOUS at 12:44

## 2025-08-27 RX ADMIN — SODIUM CHLORIDE, POTASSIUM CHLORIDE, SODIUM LACTATE AND CALCIUM CHLORIDE: 600; 310; 30; 20 INJECTION, SOLUTION INTRAVENOUS at 12:40

## 2025-08-27 RX ADMIN — PROPOFOL 100 MG: 10 INJECTION, EMULSION INTRAVENOUS at 12:43

## 2025-08-27 RX ADMIN — LIDOCAINE HYDROCHLORIDE 40 MG: 20 INJECTION, SOLUTION EPIDURAL; INFILTRATION; INTRACAUDAL; PERINEURAL at 12:43

## (undated) DEVICE — THE STERILE LIGHT HANDLE COVER IS USED WITH STERIS SURGICAL LIGHTING AND VISUALIZATION SYSTEMS.

## (undated) DEVICE — DEFENDO AIR WATER SUCTION AND BIOPSY VALVE KIT: Brand: DEFENDO AIR/WATER/SUCTION AND BIOPSY VALVE

## (undated) DEVICE — CONN JET HYDRA H20 AUXILIARY DISP

## (undated) DEVICE — LINER SURG CANSTR SXN S/RIGD 1500CC

## (undated) DEVICE — GLV SURG BIOGEL LTX PF 7 1/2

## (undated) DEVICE — SOL IRR NACL 0.9PCT BO 1000ML

## (undated) DEVICE — Device

## (undated) DEVICE — SOL IRRG H2O PL/BG 1000ML STRL

## (undated) DEVICE — SOLIDIFIER LIQLOC PLS 1500CC BT